# Patient Record
Sex: FEMALE | Race: WHITE
[De-identification: names, ages, dates, MRNs, and addresses within clinical notes are randomized per-mention and may not be internally consistent; named-entity substitution may affect disease eponyms.]

---

## 2021-01-02 ENCOUNTER — HOSPITAL ENCOUNTER (EMERGENCY)
Dept: HOSPITAL 72 - EMR | Age: 46
Discharge: HOME | End: 2021-01-02
Payer: COMMERCIAL

## 2021-01-02 VITALS — HEIGHT: 66 IN | WEIGHT: 140 LBS | BODY MASS INDEX: 22.5 KG/M2

## 2021-01-02 VITALS — SYSTOLIC BLOOD PRESSURE: 138 MMHG | DIASTOLIC BLOOD PRESSURE: 93 MMHG

## 2021-01-02 DIAGNOSIS — U07.1: Primary | ICD-10-CM

## 2021-01-02 DIAGNOSIS — R05: ICD-10-CM

## 2021-01-02 DIAGNOSIS — Z71.89: ICD-10-CM

## 2021-01-02 DIAGNOSIS — J45.909: ICD-10-CM

## 2021-01-02 DIAGNOSIS — R50.9: ICD-10-CM

## 2021-01-02 PROCEDURE — 99284 EMERGENCY DEPT VISIT MOD MDM: CPT

## 2021-01-02 PROCEDURE — 96365 THER/PROPH/DIAG IV INF INIT: CPT

## 2021-01-02 NOTE — NUR
ED Nurse Note:



infusion finished, no reaction observed, pt does not appear to be in any 
distress, VSS

## 2021-01-02 NOTE — NUR
ED Nurse Note:



pt sent by her MD Dr. Ray for monoclonal antibody infusion, states that she 
tested (+) 4 days ago. she is having fevers/chills and 10/10 body aches. pt has 
stable vital signs, last took tylenol 2 hours PTA.

## 2021-01-02 NOTE — NUR
ED Nurse Note:



pt given [facility] fact sheet, Frannie KAUFFMAN at bedside explaining risks and 
benefits of infusion to pt. she verbalized understanding and would like to 
proceed with infusion at this time

## 2021-01-02 NOTE — EMERGENCY ROOM REPORT
History of Present Illness


General


Chief Complaint:  Flu Like Symptoms


Source:  Patient





Present Illness


HPI


44 YO Female with hx of Asthma, presents to the ED c/o C/O URI symptoms x 7 

days. Pt. tested positive for COVID-19 4 days ago. Pt. reports fevers, chills, 

cough and 10/10 in severity body aches. She reports hx of asthma and reports she

has been coughing persistently . She denies mucus production.  Pt. was sent to 

ED by her Pulmonologist Dr. Ray for [facility] monoclonal antibiody infusion 

for COVID-19.  Pt. reports she took Tylenol Today at 11am. She denies allergies 

other than to codeine.


Allergies:  


Coded Allergies:  


     CODEINE (Verified  Allergy, Unknown, 1/2/21)





COVID-19 Screening


Contact w/high risk pt:  No


Experienced COVID-19 symptoms?:  Yes


COVID-19 Testing performed PTA:  Yes


COVID-19 Screening:  Positive COVID-19


COVID-19 Testing Source:  12/30





Patient History


Past Medical History:  see triage record, asthma


Past Surgical History:  none


Pertinent Family History:  none


Pregnant Now:  No


Reviewed Nursing Documentation:  PMH: Agreed; PSxH: Agreed





Nursing Documentation-PMH


Hx Asthma:  Yes





Review of Systems


All Other Systems:  negative except mentioned in HPI





Physical Exam





Vital Signs








  Date Time  Temp Pulse Resp B/P (MAP) Pulse Ox O2 Delivery O2 Flow Rate FiO2


 


1/2/21 13:20 98.1 110 18 138/93 (108) 98 Room Air  











Medical Decision Making


PA Attestation


Dr. Duron is my supervising Physician whom patient management has been 

discussed with.


Diagnostic Impression:  


   Primary Impression:  


   COVID-19


   Additional Impression:  


   Encounter to discuss treatment options


ER Course


44 YO Female with hx of Asthma, presents to the ED c/o C/O URI symptoms x 7 

days. Pt. tested positive for COVID-19 4 days ago. Pt. reports fevers, chills, 

cough and 10/10 in severity body aches. She reports hx of asthma and reports she

has been coughing persistently . She denies mucus production.  Pt. was sent to 

ED by her Pulmonologist Dr. Ray for Regeneron monoclonal antibiody infusion 

for COVID-19.  Pt. reports she took Tylenol Today at 11am. She denies allergies 

other than to codeine. 








Ddx considered but are not limited to PNA, hypoxia, asthma exacerbation, COVID-

19, other viral URI, PE/ MI just to name a few.





Vital signs: are WNL, pt. is afebrile





H&PE are most consistent with: Stable Pt. Not in acute respiratory distress at 

this time. Pt. has good oxygen saturation. No evidence of increased respiratory 

effort.  Appropriate for Outpatient conservative treatment for COVID-19. 





ORDERS: none required at this time, the diagnosis is clinical





ED INTERVENTIONS: 





[facility] - REGN-COV2 Infusion   [ 1,200 mg of Casirivimab and 1,200 mg of 

Imdevimab administered together] - FDA EUA.


- Reviewed/ d/w pt. the REGN-COV2 Infusion Drug fact sheet for patients who are 

considering receiving IV infusion. Pt. understands and is aware that this 

medication has authorization from the FDA for Emergency Use only, and is still 

undergoing full testing and monitoring before it can be fully authorized by the 

FDA.  Pt. also understands that this medication is intended to reduce or prevent

need for severe hospitalization and invasive interventions that are known to 

arise in high risk patients who are infected with the nCoV-2019. Pt. understands

this is not a cure/treatment for full remission of COVID-19.  Pt. educated and 

understands that the safety and effectiveness of the REGN-COV2 Infusion is 

limited at this time.  The pt. is educated on possible side effects as well as 

possibility of severe side effects/reactions that may arise.  Pt. is educated 

and aware that any long term effects arising from this FDA- EUA COVID-19 

treatment is not known at this time. 


  


 After education and chance to ask any questions or receive additional 

clarification, this patient has decided to move forward and receive the REGN-

COV2 Infusion for COVID-19  under FDA emergency use authorization here in the 

Emergency Dept.  





Infusion is administered via IV over the duration of 1 hour. 





The patient is monitored and observed for any reactions to infusion that require

acute medical intervention.  The patient did not have any significant reactions 

during the 1 hour observation period following infusion. 





Pt. was given their own copy of the REGN-COV2 Infusion  fact sheet to take home.

 








DISCHARGE: At this time pt. is stable for d/c to home. Will provide printed 

patient care instructions, and any necessary prescriptions. Care plan and follow

up instructions have been discussed with the patient prior to discharge.





Last Vital Signs








  Date Time  Temp Pulse Resp B/P (MAP) Pulse Ox O2 Delivery O2 Flow Rate FiO2


 


1/2/21 13:46 98.1  18 138/93 98 Room Air  


 


1/2/21 13:46  110      








Status:  improved


Disposition:  HOME, SELF-CARE


Condition:  Stable


Referrals:  


NON PHYSICIAN (PCP)


Patient Instructions:  Post-Injection Inflammatory Reaction





Additional Instructions:  


Take any previously prescribed medications as directed.   ** Watch for signs of 

adverse reactions to infusion and report them accordingly as described in 

Patient Fact Sheet for [facility]eneron Infusion that was provided to you. 





 ** Follow up with a Primary Care Provider / pulmonologist in 3-5 days, even if 

your symptoms have resolved. ** 








Return sooner to ED if new symptoms occur, or current symptoms become worse. 








- Please note that this Emergency Department Report was dictated using [email or web address] technology software, occasionally this can lead to 

erroneous entry secondary to interpretation by the dictation equipment.











Frannie Roldan               Jan 2, 2021 15:18